# Patient Record
Sex: FEMALE | Race: WHITE | NOT HISPANIC OR LATINO | Employment: OTHER | URBAN - METROPOLITAN AREA
[De-identification: names, ages, dates, MRNs, and addresses within clinical notes are randomized per-mention and may not be internally consistent; named-entity substitution may affect disease eponyms.]

---

## 2017-01-16 ENCOUNTER — ALLSCRIPTS OFFICE VISIT (OUTPATIENT)
Dept: WOUND CARE | Facility: HOSPITAL | Age: 57
End: 2017-01-16
Payer: COMMERCIAL

## 2017-01-16 PROCEDURE — 99212 OFFICE O/P EST SF 10 MIN: CPT | Performed by: SURGERY

## 2018-01-10 NOTE — PROCEDURES
Procedures by Ej Morrison RN at 8/23/2016 11:05 AM      Author:  Ej Morrison RN Service:  Interventional Radiology  Author Type:  Registered Nurse     Filed:  8/23/2016 11:15 AM Date of Service:  8/23/2016 11:05 AM Status:  Signed     :  Ej Morrison RN (Registered Nurse)         Procedure Orders:       1  Insert PICC line [id number] ordered by Ej Morrison RN at 08/23/16 1105                    PICC Line Insertion  Date/Time: 8/23/2016 11:05 AM  Performed by: Ricardo Davies  Authorized by: Renee Grace     Patient location:  Other (comment) (ir)  Consent:     Consent given by:  Patient  Universal protocol:     Procedure explained and questions answered to patient or proxy's satisfaction: yes      Relevant documents present and verified: yes      Site/side marked: yes      Immediately prior to procedure,  a time out was called: yes      Patient identity confirmed:  Verbally with patient and arm band  Pre-procedure details:     Hand hygiene: Hand hygiene performed prior to insertion      Sterile barrier technique: All elements of maximal sterile technique followed      Skin preparation:  2% chlorhexidine    Skin preparation agent: Skin preparation agent completely dried prior to procedure    Indications:     PICC line indications: vascular access    Anesthesia (see MAR for exact dosages): Anesthesia method:  None  Procedure details:     Location:  Brachial    Vessel type: vein      Laterality:  Right    Approach: percutaneous technique used      Catheter size:  5 5 Fr    Ultrasound guidance: yes      Number of attempts:  3    Successful placement: yes      Vessel of catheter tip end:  Upper SVC    Total catheter length (cm):  40    Catheter out on skin (cm):  0    Max flow rate:  999  Post-procedure details:     Post-procedure:  Dressing applied    Assessment:  Blood return through all ports    Patient tolerance of procedure:   Tolerated with difficulty Received for:Provider  EPIC   Aug 23 2016 11:14AM [facility] Standard Time

## 2018-01-13 VITALS
BODY MASS INDEX: 45.99 KG/M2 | HEART RATE: 78 BPM | HEIGHT: 67 IN | DIASTOLIC BLOOD PRESSURE: 70 MMHG | SYSTOLIC BLOOD PRESSURE: 120 MMHG | RESPIRATION RATE: 18 BRPM | TEMPERATURE: 97 F | WEIGHT: 293 LBS

## 2018-01-15 NOTE — MISCELLANEOUS
Message   Recorded as Task   Date: 2016 08:50 AM, Created By: Telma Collier   Task Name: Follow Up   Assigned To: [facility],Team   Regarding Patient: Sukhdev Resendiz, Status: Active   Comment:    Lori Valle - 14 Sep 2016 8:50 AM     TASK CREATED  {patient seen yesterday in [facility] by TB } {per TB}  She spoke to Dr Moose lambert to schedule patient with himself and/or CRNP for wound care/wound vac  Called and spoke to nurse on R2 ([place]) - she states to schedule the appointment and notify them, they'll arrange transport  Called and spoke to Stefanie Arangosandeep at [facility]      patient is scheduled for 2016 - she will contact Vick Knott to notify them of appointment  Insurance referral is required for [employer]     (patient does NOT have designated PCP)  I will arrange for referral     {Patient/GS contact information = R2 [phone], Nataliebell (work cell) [phone] }   Lori Valle - 14 Sep 2016 8:51 AM     TASK EDITED  Dr Moose Gaspar - NPI #[id number], Provider #[id number]  Fax to wound care = [phone]  Lori Valle - 15 Sep 2016 11:04 AM     TASK EDITED  [facility] provider # [phone]  Verified eligibility  They supplied PCP information for [address], [phone]  Called and spoke to Denise at [facility]  She states that patient is not in their system (name,  & SS#) and hasn't yet been seen in their office  Returned call to [employer]  Spoke to [facility]  Unable to issue insurance referral  Kareen Song #[id number], valid 2016 - 2016, 3 visits, CPT #41921  Faxed information to W[facility] Clinic  Indicated on fax that CPT code needed to be supplied to generate authorization  If different code would be billed, they must contact [place] to have CPT code changed  Active Problems    1  Acute venous embolism and thrombosis of deep vessels of proximal lower extremity   (453 41) (I82 4Y9)   2   Delayed surgical wound healing of below-the-knee amputation stump (997 69,998 83)   (T87 89,T81 89XA)   3  Edema (782 3) (R60 9)   4  Hypertension (401 9) (I10)   5  Obesity (278 00) (E66 9)   6  Pulmonary embolism (415 19) (I26 99)    Current Meds   1  Furosemide 20 MG Oral Tablet; Take 1 tablet daily; Therapy: 30QAV3044 to (566 324 313)  Requested for: 12Apr2013; Last   Rx:[id number] Ordered   2  Furosemide 40 MG Oral Tablet; TAKE 1 TABLET EVERY DAY; Therapy: 57ITF2917 to (566 324 313)  Requested for: 12Apr2013; Last   Rx:73Gai4419 Ordered   3  Lisinopril 20 MG Oral Tablet; TAKE 1 TABLET TWICE DAILY; Therapy: 61MTA3360 to (566 324 313)  Requested for: 12Apr2013; Last   Rx:[id number] Ordered   4  Spironolactone 25 MG Oral Tablet; Take 1 tablet by mouth twice daily; Therapy: 26NLR3618 to (566 324 313)  Requested for: 12Apr2013; Last   Rx:[id number] Ordered   5  Warfarin Sodium 1 MG Oral Tablet; TAKE 1 TABLET DAILY; Therapy: 75YSD3352 to (Evaluate:30Ydy6631)  Requested for: 16Apr2013; Last   Rx:[id number] Ordered   6  Warfarin Sodium 10 MG Oral Tablet; as directed; Therapy: 97YGQ5445 to (Last Rx:27Kkm3201)  Requested for: 50YJZ6884 Ordered    Allergies    1   No Known Drug Allergies    Signatures   Electronically signed by : Tyree Engle, ; Sep 15 2016 11:05AM EST                       (Author)

## 2018-01-23 NOTE — PROGRESS NOTES
Assessment    1  Open wound of left lower extremity with complication, subsequent encounter   (V58 89,891 1) (S87 238E)   2  Morbid obesity with BMI of 50 0-59 9, adult (278 01,V85 43) (E66 01,Z68 43)   3  Open wound of left lower extremity, subsequent encounter (V58 89,891 0) (C06 105P)    Wound Care Orders/Instructions    Wound Identification and Instructions   Wound #1: left BKA    Wound Care Instructions  Discussed with Patient/Caregiver  Dressing Type: Alginate  Wash with mild soap and water, normal saline, wound cleanser  As specified, use: may shower; rinse well with clear water prior to getting out of shower  Apply 4% Topical Lidocaine anesthetic solution PRN to wound/ulcer prior to debridement for pain control  Apply specified dressing to wound base/bed  Secondary dressing apply: Gauze, 5 x 9s  Secure with: Tape, hytape and stretch net  Dressing change frequency: Daily  Comments/Other:   Continue to hold NPWT for now  Insert 1/2 inch plain packing in single layer to depth of wound to serve as wick; tape tails  Alginate to wound bed; fill cavity loosely with fluffed 2 x 2s; cover with gauze and ABDs     Wound #2: left medial thigh    Wound Care Instructions  Discussed with Patient/Caregiver  Dressing Type: Alginate  Wash with mild soap and water, normal saline, wound cleanser  As specified, use: may shower; rinse well with clear water prior to getting out of shower  Apply 4% Topical Lidocaine anesthetic solution PRN to wound/ulcer prior to debridement for pain control  Apply specified dressing to wound base/bed  Secondary dressing apply: Meplex border  Dressing change frequency: Daily  Comments/Other:   roll alginate to loosely fill cavity   Wound #3: left lateral thigh   Wound Care Instructions  Discussed with Patient/Caregiver  Dressing Type: Nu-Gauze packing  Wash with mild soap and water, normal saline, wound cleanser   As specified, use: may shower; rinse well with clear water prior to getting out of shower  Apply 4% Topical Lidocaine anesthetic solution PRN to wound/ulcer prior to debridement for pain control  Apply specified dressing to wound base/bed  If using packing in a tunnel, secure tail to skin with tape   Secondary dressing apply: Meplex border  Dressing change frequency: Daily      Wound Goals  Wound Goals:   Healing Goals:   Fair healing potential secondary to moderate comorbid conditions  Wound base will be 25%    Patient will achieve full wound closure and return to full ADLs       Discussion/Summary    Ms Berman Is a 78-year-old female that is here for evaluation of her left lower extremity wounds  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  The left upper thigh wound is improving and I will continue with packing  The left lower medial wound is also improving and I will continue with alginate  The incision line on the stump has significant depth laterally and was extensively debrided  I will pack the deepest portion and cover with alginate  I will likely restart the VAC next week as the surrounding tissues are improving  Chief Complaint  [facility] up visit at [facility] for left BKA and Left medial and lateral thigh wounds  History of Present Illness    Wound Identification HPI   Wound #1: Left BKA    Wound #2: medial left thigh    Wound #3: lateral left thigh          The patient came to Wound Care via wheelchair, remains in wheelchair for visit  The patient is being seen for a follow-up with MD at the [address] [place] [address]  The patient is accompanied by her attendant  The patient's identification was verified  A secondary verification process was completed  Orientation: oriented to person, oriented to place and oriented to time  Blood Glucose:  Not applicable  9/19/16 Patient arrives for initial visit with dressings intact on Left BKA,medial and lateral thigh wounds  Patient had Left BKA by Rubi Olivas with wound dehiscence laterally  Also 2 I and D sites of left medial and lateral thigh  L BKA site with retention sutures and staples intact and large dehisced site with mostly slough  [place] [address] had placed NPWT on site but removed it prior to patient coming  Patient is morbidly obese lives with her daughter and granddaughter,currently in [place] [address] for Rehab,  9/26/16 Pt arrived in wheelchair with dressings intact to L BKA, L medial thigh, and L Lateral thigh  She offers no specific complaints today  History of Falling: Yes = 25   Secondary Diagnosis: Yes = 15   Ambulatory Aid None, Bedrest, Nurse Assist = 0   No = 0   Gait: Impaired = 20 -- Short steps with shuffle, may have difficulty arising from chair, head down, significantly impaired balance, requiring furniture, support person, or walking aid to walk  Mental Status: Oriented to own ability = 0   Total Score: 60     > 45 = High Risk         Dillon Scale:   Sensory Perception: No impairment = 4   Moisture: Very moist = 2   Activity: Chairfast = 2   Mobility: Very limited = 2   Nutrition: Probably inadequate = 2   Friction and Shear: Potential problem = 2   Total Dillon Score: 14       The most recent fall occurred Denies falls since last visit 9/26/16  Number of falls in the last year were 0  Nutrition Assessment Screening: Food intake over the last 3 months due to the loss of appetite, digestive problems, chewing or swallowing difficulties is graded as: 2 = no decrease in food intake   Weight loss during the last 3 months: 3 = no weight loss   Mobility scored as: 0 = bed or chair bound  Psychological Stress and Acute Disease Scored as: 2 = The patient has not experienced psychological stress or acute disease in the last 3 months  Neuropsychological problems scored as: 2 = no psychological problems  Body Mass Index (BMI) scored as: 3 = BMI 23 or greater     Nutritional Assessment Screening Score: 12 - 14 points - Normal nutritional status  Provider Wound Care HPI: Patient is here for follow-up on left lower extremity wounds  She had a significant necrotizing soft tissue infection of her left lower extremity requiring Guillotine amputation followed by closure  She is here for evaluation of her wounds  Pain Assessment   the patient states they have pain  The pain is located in the left BKA when touched  (on a scale of 0 to 10, the patient rates the pain at 8 )   Abuse And Domestic Violence Screen    Yes, the patient is safe at home  The patient states no one is hurting them  Depression And Suicide Screen  No, the patient has not had thoughts of hurting themself  No, the patient has not felt depressed in the past 7 days  Prefered Language is  [place]  Primary Language is  English  Readiness To Learn: Receptive  Barriers To Learning: economic  Preferred Learning: demonstration and written   Education Completed: disease/condition, medications and equipment/supplies   Teaching Method: written and demonstration   Person Taught: patient   Evaluation Of Learning: verbalized/demonstrated understanding      Active Problems     1  Acute venous embolism and thrombosis of deep vessels of proximal lower extremity   (453 41) (I82 4Y9)   2  Delayed surgical wound healing of below-the-knee amputation stump (997 69,998 83)   (T87 89,T81 89XA)   3  Edema (782 3) (R60 9)   4  Hypertension (401 9) (I10)   5  Morbid obesity with BMI of 50 0-59 9, adult (278 01,V85 43) (E66 01,Z68 43)   6  Obesity (278 00) (E66 9)   7  Pulmonary embolism (415 19) (I26 99)    Open wound of right lower extremity with complication, initial encounter (891 1) (S81 801A)       Open wound of right lower extremity, initial encounter (891 0) (C30 660B)          Past Medical History    1  History of Acute deep vein thrombosis of lower limb, unspecified laterality   2  History of essential hypertension (V12 59) (Z86 79)   3   History of obesity (V13 89) (Z86 39)    The active problems and past medical history were reviewed and updated today  Surgical History     1  History of  Section   2  History of Tonsillectomy    The surgical history was reviewed and updated today  History of Amputation Of Leg Below Knee             Family History    The family history was reviewed and updated today  Social History    · Never A Smoker  The social history was reviewed and updated today  Current Meds   1  Furosemide 20 MG Oral Tablet; Take 1 tablet daily; Therapy: 91PKE1210 to (Dustin Bounds)  Requested for: 85Aae4483; Last   Rx:[id number] Ordered   2  Furosemide 40 MG Oral Tablet; TAKE 1 TABLET EVERY DAY; Therapy: 14MRX2185 to (Dustin Bounds)  Requested for: 31Efn8176; Last   Rx:00Axd9968 Ordered   3  Lidocaine HCl - 4 % External Solution; apply to wound/s at [facility] for pain control prior to   debridement; Therapy: (Recorded:84Jro8194) to Recorded   4  Lisinopril 20 MG Oral Tablet; TAKE 1 TABLET TWICE DAILY; Therapy: 80NGM0448 to (Dustin Bounds)  Requested for: 49Oml3569; Last   Rx:93Byr8972 Ordered   5  Warfarin Sodium 1 MG Oral Tablet; TAKE 1 TABLET DAILY; Therapy: 61JYP5903 to (Evaluate:25Etc2805)  Requested for: 64Gyt0221; Last   Rx:02Chr2208 Ordered   6  Warfarin Sodium 10 MG Oral Tablet; as directed; Therapy: 40TCI6116 to (Last Rx:67Gbr6623)  Requested for: 95CBG4459 Ordered    The medication list was reviewed and updated today  Allergies    1  No Known Drug Allergies    Vitals  Vital Signs    Recorded: 01FUR1968 03:30XF   Systolic 946   Diastolic 72   Heart Rate 84   Respiration 18   Temperature 99 4 F, Tympanic   Pain Scale 0   Height 5 ft 7 in   Weight 359 lb    BMI Calculated 56 23   BSA Calculated 2 59     Physical Exam    Wound #1 Assessment left BKA, Care for this wound started on 16  Wound Status: not healed     Length: 6cm x Width: 21 5cm x Depth: 5 5cm   Total: 129sq cm   Wound Volume: 709 5cm3 Tissue type: Subcutaneous, Granulation and Slough   Color of Wound: Yellow - 45%, Black -, Pink - 50% and Grey - 5%   Exudate Amount: Large   Exudate Type: Serosangiunous   Odor: None   Exudate Color: Tan   Wound Edges: Intact   Periwound Skin Condition: Intact, Erythematous, Edema   Comments: 5 areas measured, several loose scabs removed by Dr Colton Batista today  Physician/Provider Wound #1 Exam   I agree with the nursing assessment and documentation  Wound #2 Assessment wound #2 Location:, left medial thigh, Care for this wound started on 9/19/16  Wound Status: not healed  Length: 5cm x Width: 2 5cm x Depth: 1 1cm   Total: 12 5sq cm   Wound Volume: 13 75cm3           Tissue type: Subcutaneous, Granulation and Slough   Color of Wound: Red -, Yellow - 50% and Pink - 50%   Exudate Amount: Moderate   Exudate Type: Serosangiunous and Purulent   Odor: None   Exudate Color: Tan   Wound Edges: Intact and Rolled (epibolized)   Periwound Skin Condition: Intact, indurated            Physician/Provider Wound #2 Exam   I agree with the nursing assessment and documentation  Wound #3 Assessment wound #3 Location:, left lateral thigh  Care for this wound started on 9/19/16   Wound Status: not healed  Length: 1cm x Width: 0 3cm x Depth: 1 8cm   Total: 0 3sq cm   Wound Volume: 0 54cm3           Tissue type: unable to visualize base unable to visualize base   Exudate Amount: Scant   Exudate Type: Serosangiunous and Purulent   Odor: None   Exudate Color: Tan   Wound Edges: Intact   Periwound Skin Condition: Intact, Erythematous            Physician/Provider Wound #3 Exam   I agree with the nursing assessment and documentation  Jose Miguel Maurice 1:    Wound Nursing Care Plan   Impaired Tissue Integrity related to:   Knowledge Deficit Related To:   Risk for Infection related to open wound:   Pain related to disease process and/or wound treatment:   Imbalanced Nutrition, less than body requirements related to inadequate intake and/or disease process:   Impaired Mobility related to: L JOHNIEA   Self Care Deficit related to wound dressing changes:   Goals   Patient will achieve 100% epithelialization:  Patient will maintain skin integrity:  Patient will demonstrate and verbalize knowledge of their disease process and management:  Patient will verbalize signs and symptoms of infection and when to notify physician or [facility]:    Patient will demonstrate and verbalize infection control and preventative measures:  Patient will verbalize knowledge of and demonstrate adherence to interventions to achieve optimal nutrition required for wound healing:  Patient will be able to maintain or increase current ability to perform ADLs: Marleny Childs Patient will demonstrate offloading and body positioning to effectively decrease edema:  Wound Nursing Care Interventions:   Provide moist wound healing:  Evaluate current medication regime for medications which may slow/impede wound healing: coumadin  Implement offloading measures (turn & reposition schedule/method, ortho shoes/boots, floating heels, crutches, specialty surfaces:  Teach and evaluate effectiveness of offloading measures:  Teach patient and/or family about disease process and management methods:  Teach patient to take pain medication 30 minutes prior to wound care visit:  Assess patient's nutrition on each wound care visit (including protein and fluid intake): Marleny Childs Educate on diet and hydration required to enhance wound healing:  Assess mobility and how it may affect wound healing:  Teach patient on edema reducing measures:  Assess patient's coping skills (is the wound interrupting their lifestyle, social interactions, etc ):    Assess for patient compliance to established plan of care: Marleny Childs    Other:  Care plan initiated 9/19/16, reviewed and updated 9/26/16      Procedure      Wound #1: L BKA     Nurse Dressing Change: Wound ##1 L BKA The wound located on the L BKA  Applied 4% topical Lidocaine solution to wound/ulcer prior to debridement for pain control  Wound care rendered as per Physician/Advanced Practitioner order/plan  Order sent with patient to facility  Return to [facility] 1 week Dr Joanne Patel  Comments:    Excisional Debridement Subcutaneous Tissue:   Wound was excisionally debrided to subcutaneous tissue as follows  Prior to the procedure, the patient was identified using two identifiers, the general consent was signed, the proper site of procedure was identified, and a time out was taken  Anesthesia: Local anesthesia with 4% topical lidocaine was utilized prior to the procedure for pain control  #15 surgical blade and a curette was utilized to surgically excise devitalized tissue and/or slough through epidermis, dermis and into the subcutaneous tissue  The total sq cm excised was 105sq cm  There was minimal bleeding controlled with gentle pressure  the patient tolerated the procedure well without complication  CPT Code(s)   [id number] - Excisional DebridementTo Subcutaneous Tissue; first 20 sq cm   19566 - Each Additional 20 sq cm - Excisional DebridementTo Subcutaneous Tissue  Wound #2: L medial thigh     Nurse Dressing Change:   Wound #2 The wound located on the L med thigh  Applied 4% topical Lidocaine solution to wound/ulcer prior to debridement for pain control  Wound care rendered as per Physician/Advanced Practitioner order/plan  Order sent with patient to facility  Return to [facility] 1 week Dr Joanne Patel  Comments:   Wound #3: L lateral thigh     Nurse Dressing Change:   Wound #3 The wound located on the lateral thigh  Applied 4% topical Lidocaine solution to wound/ulcer prior to debridement for pain control  Wound care rendered as per Physician/Advanced Practitioner order/plan  Order sent with patient to facility      Return to Wound Management [place] 1 week Dr Jessica Petit    Comments:      Future Appointments    Date/Time Provider Specialty Site   10/03/2016 01:45 PM Justine Sherman MD General Surgery [facility]     Signatures   Electronically signed by : My Zaragoza MD; Sep 27 2016  9:12AM EST                       (Author)

## 2018-01-24 NOTE — PROGRESS NOTES
Assessment    1  Open wound of left lower extremity with complication, subsequent encounter   (V58 89,891 1) (B04 395T)   2  Morbid obesity with BMI of 50 0-59 9, adult (278 01,V85 43) (D85 53,I58 26)    Wound Care Orders/Instructions    Wound Identification and Instructions   Wound #1: left BKA    Wound Care Instructions  Discussed with Patient/Caregiver  Dressing Type: Alginate  Wash with mild soap and water, normal saline, wound cleanser  As specified, use: may shower; rinse well with clear water prior to getting out of shower  Apply 4% Topical Lidocaine anesthetic solution PRN to wound/ulcer prior to debridement for pain control  Apply specified dressing to wound base/bed  Secondary dressing apply: Gauze, 5 x 9s  Secure with: Tape, hytape and stretch net  Dressing change frequency: Three times per week  Comments/Other:   restart NPWT black sponge tracked to dorsal leg and in 2 sites drain site and lateral BKA incision  AMD PACKING IN WOUNDS CENTRALLY AND MEDIALLY  Wound #2: left medial thigh    Wound Care Instructions  Discussed with Patient/Caregiver  Dressing Type: Alginate  Wash with mild soap and water, normal saline, wound cleanser  As specified, use: may shower; rinse well with clear water prior to getting out of shower  Apply 4% Topical Lidocaine anesthetic solution PRN to wound/ulcer prior to debridement for pain control  Apply specified dressing to wound base/bed  Secondary dressing apply: Meplex border  Dressing change frequency: Daily  Comments/Other:   roll alginate to loosely fill cavity   Wound #3: left lateral thigh/Healed 10/24/16   Wound Care Instructions  Discussed with Patient/Caregiver  Wash with mild soap and water, normal saline, wound cleanser  As specified, use: may shower; rinse well with clear water prior to getting out of shower    Comments/Other:   Wound healed      Wound Goals  Wound Goals:   Healing Goals:   Fair healing potential secondary to moderate comorbid conditions  Wound base will be 25%    Patient will achieve full wound closure and return to full ADLs       Discussion/Summary    Ms Berman Is a 43-year-old female that is here for multiple wounds of her left lower extremity  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 for left BKA and Left medial and lateral thigh wounds  History of Present Illness    Wound Identification HPI   Wound #1: Left BKA    Wound #2: medial left thigh    Wound #3: lateral left thigh healed 10/24/16          The patient came to Wound Care via wheelchair, remains in wheelchair for visit  The patient is being seen for a follow-up with MD at the [address] [place] [address]  The patient is accompanied by her attendant  The patient's identification was verified  A secondary verification process was completed  Orientation: oriented to person, oriented to place and oriented to time  Blood Glucose:  Not applicable  9/19/16 Patient arrives for initial visit with dressings intact on Left BKA,medial and lateral thigh wounds  Patient had Left BKA by Apolinar Durant with wound dehiscence laterally  Also 2 I and D sites of left medial and lateral thigh  L BKA site with retention sutures and staples intact and large dehisced site with mostly slough  [place] [address] had placed NPWT on site but removed it prior to patient coming  Patient is morbidly obese lives with her daughter and granddaughter,currently in [place] [address] for Rehab,  9/26/16 Pt arrived in wheelchair with dressings intact to L BKA, L medial thigh, and L Lateral thigh   She offers no specific complaints today 10/3/16 Patient arrives with dressings intact on LBKA ,medial and lateral thigh  Increased pink tissue in wound base  10/24/16 Pt arrived in wheelchair with wound vac running 125 mmHg continuous  Dressing intact  Pt states wound burns sometimes depending what she is doing and it's intermittent  Pt reports she was recently started on Iron, but does not know the mg  lateral thigh wound healed  History of Falling: Yes = 25   Secondary Diagnosis: Yes = 15   Ambulatory Aid None, Bedrest, Nurse Assist = 0   No = 0   Gait: Impaired = 20 -- Short steps with shuffle, may have difficulty arising from chair, head down, significantly impaired balance, requiring furniture, support person, or walking aid to walk  Mental Status: Oriented to own ability = 0   Total Score: 60     > 45 = High Risk         Dillon Scale:   Sensory Perception: No impairment = 4   Moisture: Very moist = 2   Activity: Chairfast = 2   Mobility: Very limited = 2   Nutrition: Probably inadequate = 2   Friction and Shear: Potential problem = 2   Total Dillon Score: 14       The most recent fall occurred Denies falls since last visit 10/24/16  Number of falls in the last year were 0  Nutrition Assessment Screening: Food intake over the last 3 months due to the loss of appetite, digestive problems, chewing or swallowing difficulties is graded as: 2 = no decrease in food intake   Weight loss during the last 3 months: 3 = no weight loss   Mobility scored as: 0 = bed or chair bound  Psychological Stress and Acute Disease Scored as: 2 = The patient has not experienced psychological stress or acute disease in the last 3 months  Neuropsychological problems scored as: 2 = no psychological problems  Body Mass Index (BMI) scored as: 3 = BMI 23 or greater  Nutritional Assessment Screening Score: 12 - 14 points - Normal nutritional status  Provider Wound Care HPI: Patient is here for follow-up for left extremity wounds   She status post guillotine amputation followed by revision for gas gangrene  She was recently discharged from rehabilitation  Overall she's doing well  She has been tolerating the VAC dressing withoutDifficulty         Pain Assessment   the patient states they have pain  The pain is located in the left BKA when touched  The pain radiates to the Burning comes depending what she is doing and it is intermittent  The patient describes the pain as burning  (on a scale of 0 to 10, the patient rates the pain at 7 )   Abuse And Domestic Violence Screen    Yes, the patient is safe at home  The patient states no one is hurting them  Depression And Suicide Screen  No, the patient has not had thoughts of hurting themself  No, the patient has not felt depressed in the past 7 days  Prefered Language is  [place]  Primary Language is  English  Readiness To Learn: Receptive  Barriers To Learning: economic  Preferred Learning: demonstration and written   Education Completed: disease/condition, medications and equipment/supplies   Teaching Method: written and demonstration   Person Taught: patient   Evaluation Of Learning: verbalized/demonstrated understanding      Active Problems    1  Acute venous embolism and thrombosis of deep vessels of proximal lower extremity   (453 41) (I82 4Y9)   2  Delayed surgical wound healing of below-the-knee amputation stump (997 69,998 83)   (T87 89,T81 89XA)   3  Edema (782 3) (R60 9)   4  Hypertension (401 9) (I10)   5  Morbid obesity with BMI of 50 0-59 9, adult (278 01,V85 43) (E66 01,Z68 43)   6  Obesity (278 00) (E66 9)   7  Open wound of left lower extremity with complication, initial encounter (891 1) (S81 802A)   8  Open wound of left lower extremity with complication, subsequent encounter   (V58 89,891 1) (S81 802D)   9  Open wound of left lower extremity, initial encounter (891 0) (S81 802A)   10  Open wound of left lower extremity, subsequent encounter (V58 89,891 0) (S81 802D)   11   Open wound of right lower extremity with complication, initial encounter (891 1)    (S81 801A)   12  Open wound of right lower extremity with complication, subsequent encounter    (V58 89,891 1) (S81 801D)   13  Open wound of right lower extremity, initial encounter (891 0) (S81 801A)   14  Open wound of right lower extremity, subsequent encounter (V58 89,891 0) (S81 801D)   15  Pulmonary embolism (415 19) (I26 99)    Past Medical History    1  History of Acute deep vein thrombosis of lower limb, unspecified laterality   2  History of essential hypertension (V12 59) (Z86 79)   3  History of obesity (V13 89) (Z86 39)    The active problems and past medical history were reviewed and updated today  Surgical History    1  History of Amputation Of Leg Below Knee   2  History of  Section   3  History of Tonsillectomy    The surgical history was reviewed and updated today  Family History    The family history was reviewed and updated today  Social History    · Never A Smoker  The social history was reviewed and is unchanged  Current Meds   1  Furosemide 20 MG Oral Tablet; Take 1 tablet daily; Therapy: 73JEX3577 to (Filiberto Mad)  Requested for: 2013; Last   Rx:2013 Ordered   2  Furosemide 40 MG Oral Tablet; TAKE 1 TABLET EVERY DAY; Therapy: 53VZI2680 to (Filiberto Mad)  Requested for: 2013; Last   Rx:2013 Ordered   3  Iron Supplement 325 (65 Fe) MG TABS; Therapy: (Recorded:41Fjc9090) to Recorded   4  Lidocaine HCl - 4 % External Solution; apply to wound/s at [facility] for pain control prior to   debridement; Therapy: (Recorded:58Yba4598) to Recorded   5  Lisinopril 20 MG Oral Tablet; TAKE 1 TABLET TWICE DAILY; Therapy: 64MPA0150 to (Filiberto Mad)  Requested for: 2013; Last   Rx:2013 Ordered   6  Warfarin Sodium 1 MG Oral Tablet; TAKE 1 TABLET DAILY; Therapy: 67IKN5643 to (Evaluate:42Pli5457)  Requested for: 2013; Last   Rx:15Ury3283 Ordered   7   Warfarin Sodium 10 MG Oral Tablet; as directed; Therapy: 69YAQ1369 to (Last Rx:52Xso5987)  Requested for: 94BBI5503 Ordered    The medication list was reviewed and updated today  Allergies    1  No Known Drug Allergies    Vitals  Vital Signs    Recorded: 52OYL0790 25:55DB   Systolic 880   Diastolic 74   Heart Rate 88   Respiration 18   Temperature 97 5 F   Pain Scale 7   Height 5 ft 7 in   Weight 359 lb    BMI Calculated 56 23   BSA Calculated 2 59     Physical Exam    Wound #1 Assessment wound #1 Location:, left BKA, Care for this wound started on 9/19/16  Wound Status: not healed  Length: 2cm x Width: 5cm x Depth: 0 7cm   Total: 10sq cm   Wound Volume: 7cm3           Tissue type: Subcutaneous, Granulation and Slough   Color of Wound: Red - 90%, Yellow - 10%, Black - ~U%, Pink - and Grey -   Exudate Amount: Moderate   Exudate Type: Serosangiunous   Odor: None   Exudate Color: Tan   Wound Edges: Intact   Periwound Skin Condition: Intact, Erythematous, Edema   Comments: 5 areas measured, No eschar  Physician/Provider Wound #1 Exam   I agree with the nursing assessment and documentation  Wound #2 Assessment wound #2 Location:, left medial thigh, Care for this wound started on 9/19/16  Wound Status: not healed  Length: 0 2cm x Width: 1cm x Depth: 0 7cm   Total: 0 2sq cm   Wound Volume: 1 4cm3           Tissue type: Subcutaneous, Granulation and Slough   Color of Wound: Red - 100%, Yellow - and Pink -   Exudate Amount: Minimal   Exudate Type: Serosangiunous   Odor: None   Exudate Color: Tan   Wound Edges: Intact   Periwound Skin Condition: Intact, indurated            Physician/Provider Wound #2 Exam   I agree with the nursing assessment and documentation  Wound #3 Assessment wound #3 Location:, left lateral thigh, healed on 10/24/16  Care for this wound started on 9/19/16   Wound Status: healed     Length: 0 cm x Width: 0 cm x Depth: 0 cm   Total: 0 sq cm   Wound Volume: 0 cm3           Tissue type: Epidermis and unable to visualize base unable to visualize base   Exudate Amount: None   Odor: None   Periwound Skin Condition: Erythematous   Comments: Wound healed  Physician/Provider Wound #3 Exam   I agree with the nursing assessment and documentation  Jose Miguel Maurice 1: Wound Nursing Care Plan   Impaired Tissue Integrity related to:   Knowledge Deficit Related To:   Risk for Infection related to open wound:   Pain related to disease process and/or wound treatment:   Imbalanced Nutrition, less than body requirements related to inadequate intake and/or disease process:   Impaired Mobility related to: L BKA   Self Care Deficit related to wound dressing changes:   Goals   Patient will achieve 100% epithelialization:  Patient will maintain skin integrity:  Patient will demonstrate and verbalize knowledge of their disease process and management:  Patient will verbalize signs and symptoms of infection and when to notify physician or [facility]:    Patient will demonstrate and verbalize infection control and preventative measures:  Patient will verbalize knowledge of and demonstrate adherence to interventions to achieve optimal nutrition required for wound healing:  Patient will be able to maintain or increase current ability to perform ADLs: Stephanie Vargas Patient will demonstrate offloading and body positioning to effectively decrease edema:  Wound Nursing Care Interventions:   Provide moist wound healing:  Evaluate current medication regime for medications which may slow/impede wound healing: coumadin  Implement offloading measures (turn & reposition schedule/method, ortho shoes/boots, floating heels, crutches, specialty surfaces:  Teach and evaluate effectiveness of offloading measures:  Teach patient and/or family about disease process and management methods:  Teach patient to take pain medication 30 minutes prior to wound care visit:     Assess patient's nutrition on each wound care visit (including protein and fluid intake): Hair Van Educate on diet and hydration required to enhance wound healing:  Assess mobility and how it may affect wound healing:  Teach patient on edema reducing measures:  Assess patient's coping skills (is the wound interrupting their lifestyle, social interactions, etc ):    Assess for patient compliance to established plan of care: Hair Van Other:  Care plan initiated 9/19/16, reviewed and updated 9/26/16,10/24/16      Procedure      Wound #1: BKA L     Nurse Dressing Change:   Wound #1 The wound located on the L BKA  Applied 4% topical Lidocaine solution to wound/ulcer prior to debridement for pain control  Wound care rendered as per Physician/Advanced Practitioner order/plan  Order sent with patient to facility  Return to [facility]  Comments:,   It was noted that and 3 pieces of black sponge were removed from the wound bed  The canister contained 50cc, of Tan drainage  Negative Pressure Wound Therapy Dressing Removal:  Prior to the procedure, the patient was identified using two identifiers, the general consent was signed, the proper site of procedure was identified, and a time out was taken  Patient arrived with negative pressure wound therapy dressing sealed and intact with the pressure set at 125mmHg continuous  Comments: No Communication label, one black sponge bridge removed and two black sponge wound base  NWPT REMOVED AND NOT REAPPLIED  Excisional Debridement Subcutaneous Tissue:   Wound was excisionally debrided to subcutaneous tissue as follows  Prior to the procedure, the patient was identified using two identifiers, the general consent was signed, the proper site of procedure was identified, and a time out was taken  Anesthesia: Local anesthesia with 4% topical lidocaine was utilized prior to the procedure for pain control     A curette was utilized to surgically excise devitalized tissue and/or slough through epidermis, dermis and into the subcutaneous tissue  The total sq cm excised was 10sq cm  There was minimal bleeding controlled with gentle pressure  the patient tolerated the procedure well without complication  CPT Code(s)   [id number] - Excisional DebridementTo Subcutaneous Tissue; first 20 sq cm  Wound #2: L Med thigh     Nurse Dressing Change:   Wound #2 The wound located on the L medial thigh  Applied 4% topical Lidocaine solution to wound/ulcer prior to debridement for pain control  Wound care rendered as per Physician/Advanced Practitioner order/plan  Order sent with patient to facility  Return to [place] [address]  Comments:   Wound #3: Lat L thigh/healed 10/24/16     Nurse Dressing Change:   Wound #3 The wound located on the Lat L thigh  Applied 4% topical Lidocaine solution to wound/ulcer prior to debridement for pain control  Wound care rendered as per Physician/Advanced Practitioner order/plan  Order sent with patient to facility  Return to [place] [address]    Comments:, Wound healed      Future Appointments    Date/Time Provider Specialty Site   11/07/2016 01:45 PM Abi Ludwig MD General Surgery [facility]     Signatures   Electronically signed by : Jonathan Brown MD; Oct 25 2016 10:22AM EST                       (Author)

## 2018-10-12 ENCOUNTER — APPOINTMENT (OUTPATIENT)
Dept: LAB | Facility: HOSPITAL | Age: 58
End: 2018-10-12
Payer: COMMERCIAL

## 2018-10-12 ENCOUNTER — TRANSCRIBE ORDERS (OUTPATIENT)
Dept: ADMINISTRATIVE | Facility: HOSPITAL | Age: 58
End: 2018-10-12

## 2018-10-12 DIAGNOSIS — I10 ESSENTIAL HYPERTENSION, MALIGNANT: ICD-10-CM

## 2018-10-12 DIAGNOSIS — I48.20 CHRONIC ATRIAL FIBRILLATION (HCC): ICD-10-CM

## 2018-10-12 DIAGNOSIS — I10 ESSENTIAL HYPERTENSION, MALIGNANT: Primary | ICD-10-CM

## 2018-10-12 LAB
ALBUMIN SERPL BCP-MCNC: 4 G/DL (ref 3–5.2)
ALP SERPL-CCNC: 63 U/L (ref 43–122)
ALT SERPL W P-5'-P-CCNC: 20 U/L (ref 9–52)
ANION GAP SERPL CALCULATED.3IONS-SCNC: 8 MMOL/L (ref 5–14)
AST SERPL W P-5'-P-CCNC: 23 U/L (ref 14–36)
BASOPHILS # BLD AUTO: 0 THOUSANDS/ΜL (ref 0–0.1)
BASOPHILS NFR BLD AUTO: 1 % (ref 0–1)
BILIRUB SERPL-MCNC: 0.5 MG/DL
BUN SERPL-MCNC: 14 MG/DL (ref 5–25)
CALCIUM SERPL-MCNC: 9 MG/DL (ref 8.4–10.2)
CHLORIDE SERPL-SCNC: 102 MMOL/L (ref 97–108)
CHOLEST SERPL-MCNC: 222 MG/DL
CO2 SERPL-SCNC: 29 MMOL/L (ref 22–30)
CREAT SERPL-MCNC: 0.75 MG/DL (ref 0.6–1.2)
EOSINOPHIL # BLD AUTO: 0.2 THOUSAND/ΜL (ref 0–0.4)
EOSINOPHIL NFR BLD AUTO: 3 % (ref 0–6)
ERYTHROCYTE [DISTWIDTH] IN BLOOD BY AUTOMATED COUNT: 14.4 %
GFR SERPL CREATININE-BSD FRML MDRD: 89 ML/MIN/1.73SQ M
GLUCOSE SERPL-MCNC: 105 MG/DL (ref 70–99)
HCT VFR BLD AUTO: 38.7 % (ref 36–46)
HDLC SERPL-MCNC: 34 MG/DL (ref 40–59)
HGB BLD-MCNC: 13 G/DL (ref 12–16)
INR PPP: 2.17 (ref 0.89–1.1)
LDLC SERPL CALC-MCNC: 145 MG/DL
LYMPHOCYTES # BLD AUTO: 1.4 THOUSANDS/ΜL (ref 0.5–4)
LYMPHOCYTES NFR BLD AUTO: 24 % (ref 20–50)
MCH RBC QN AUTO: 31.2 PG (ref 26–34)
MCHC RBC AUTO-ENTMCNC: 33.5 G/DL (ref 31–36)
MCV RBC AUTO: 93 FL (ref 80–100)
MONOCYTES # BLD AUTO: 0.4 THOUSAND/ΜL (ref 0.2–0.9)
MONOCYTES NFR BLD AUTO: 6 % (ref 1–10)
NEUTROPHILS # BLD AUTO: 3.9 THOUSANDS/ΜL (ref 1.8–7.8)
NEUTS SEG NFR BLD AUTO: 66 % (ref 45–65)
NONHDLC SERPL-MCNC: 188 MG/DL
PLATELET # BLD AUTO: 208 THOUSANDS/UL (ref 150–450)
PMV BLD AUTO: 8.4 FL (ref 8.9–12.7)
POTASSIUM SERPL-SCNC: 3.8 MMOL/L (ref 3.6–5)
PROT SERPL-MCNC: 7.3 G/DL (ref 5.9–8.4)
PROTHROMBIN TIME: 22 SECONDS (ref 9.5–11.6)
RBC # BLD AUTO: 4.15 MILLION/UL (ref 4–5.2)
SODIUM SERPL-SCNC: 139 MMOL/L (ref 137–147)
TRIGL SERPL-MCNC: 215 MG/DL
TSH SERPL DL<=0.05 MIU/L-ACNC: 3.05 UIU/ML (ref 0.47–4.68)
WBC # BLD AUTO: 5.9 THOUSAND/UL (ref 4.5–11)

## 2018-10-12 PROCEDURE — 80061 LIPID PANEL: CPT

## 2018-10-12 PROCEDURE — 85610 PROTHROMBIN TIME: CPT

## 2018-10-12 PROCEDURE — 85025 COMPLETE CBC W/AUTO DIFF WBC: CPT

## 2018-10-12 PROCEDURE — 36415 COLL VENOUS BLD VENIPUNCTURE: CPT

## 2018-10-12 PROCEDURE — 84443 ASSAY THYROID STIM HORMONE: CPT

## 2018-10-12 PROCEDURE — 80053 COMPREHEN METABOLIC PANEL: CPT

## 2019-08-20 ENCOUNTER — TRANSCRIBE ORDERS (OUTPATIENT)
Dept: ADMINISTRATIVE | Facility: HOSPITAL | Age: 59
End: 2019-08-20

## 2019-08-20 ENCOUNTER — APPOINTMENT (OUTPATIENT)
Dept: LAB | Facility: HOSPITAL | Age: 59
End: 2019-08-20
Payer: COMMERCIAL

## 2019-08-20 DIAGNOSIS — E78.2 MIXED HYPERLIPIDEMIA: ICD-10-CM

## 2019-08-20 DIAGNOSIS — Z12.31 VISIT FOR SCREENING MAMMOGRAM: ICD-10-CM

## 2019-08-20 DIAGNOSIS — I10 ESSENTIAL HYPERTENSION: ICD-10-CM

## 2019-08-20 DIAGNOSIS — I48.20 CHRONIC ATRIAL FIBRILLATION (HCC): ICD-10-CM

## 2019-08-20 DIAGNOSIS — I10 ESSENTIAL HYPERTENSION: Primary | ICD-10-CM

## 2019-08-20 LAB
ALBUMIN SERPL BCP-MCNC: 3.7 G/DL (ref 3.5–5)
ALP SERPL-CCNC: 63 U/L (ref 46–116)
ALT SERPL W P-5'-P-CCNC: 18 U/L (ref 12–78)
ANION GAP SERPL CALCULATED.3IONS-SCNC: 9 MMOL/L (ref 4–13)
AST SERPL W P-5'-P-CCNC: 15 U/L (ref 5–45)
BASOPHILS # BLD AUTO: 0.04 THOUSANDS/ΜL (ref 0–0.1)
BASOPHILS NFR BLD AUTO: 1 % (ref 0–1)
BILIRUB SERPL-MCNC: 0.48 MG/DL (ref 0.2–1)
BUN SERPL-MCNC: 17 MG/DL (ref 5–25)
CALCIUM SERPL-MCNC: 8.8 MG/DL (ref 8.3–10.1)
CHLORIDE SERPL-SCNC: 103 MMOL/L (ref 100–108)
CHOLEST SERPL-MCNC: 238 MG/DL (ref 50–200)
CK SERPL-CCNC: 48 U/L (ref 26–192)
CO2 SERPL-SCNC: 28 MMOL/L (ref 21–32)
CREAT SERPL-MCNC: 0.94 MG/DL (ref 0.6–1.3)
EOSINOPHIL # BLD AUTO: 0.15 THOUSAND/ΜL (ref 0–0.61)
EOSINOPHIL NFR BLD AUTO: 2 % (ref 0–6)
ERYTHROCYTE [DISTWIDTH] IN BLOOD BY AUTOMATED COUNT: 13.5 % (ref 11.6–15.1)
GFR SERPL CREATININE-BSD FRML MDRD: 67 ML/MIN/1.73SQ M
GLUCOSE P FAST SERPL-MCNC: 119 MG/DL (ref 65–99)
HCT VFR BLD AUTO: 40.7 % (ref 34.8–46.1)
HDLC SERPL-MCNC: 41 MG/DL (ref 40–60)
HGB BLD-MCNC: 12.9 G/DL (ref 11.5–15.4)
IMM GRANULOCYTES # BLD AUTO: 0.03 THOUSAND/UL (ref 0–0.2)
IMM GRANULOCYTES NFR BLD AUTO: 1 % (ref 0–2)
INR PPP: 3.21 (ref 0.84–1.19)
LDLC SERPL CALC-MCNC: 164 MG/DL (ref 0–100)
LYMPHOCYTES # BLD AUTO: 1.05 THOUSANDS/ΜL (ref 0.6–4.47)
LYMPHOCYTES NFR BLD AUTO: 16 % (ref 14–44)
MCH RBC QN AUTO: 30.6 PG (ref 26.8–34.3)
MCHC RBC AUTO-ENTMCNC: 31.7 G/DL (ref 31.4–37.4)
MCV RBC AUTO: 97 FL (ref 82–98)
MONOCYTES # BLD AUTO: 0.41 THOUSAND/ΜL (ref 0.17–1.22)
MONOCYTES NFR BLD AUTO: 6 % (ref 4–12)
NEUTROPHILS # BLD AUTO: 4.96 THOUSANDS/ΜL (ref 1.85–7.62)
NEUTS SEG NFR BLD AUTO: 74 % (ref 43–75)
NONHDLC SERPL-MCNC: 197 MG/DL
NRBC BLD AUTO-RTO: 0 /100 WBCS
PLATELET # BLD AUTO: 210 THOUSANDS/UL (ref 149–390)
PMV BLD AUTO: 10.4 FL (ref 8.9–12.7)
POTASSIUM SERPL-SCNC: 3.4 MMOL/L (ref 3.5–5.3)
PROT SERPL-MCNC: 7.7 G/DL (ref 6.4–8.2)
PROTHROMBIN TIME: 33.6 SECONDS (ref 11.6–14.5)
RBC # BLD AUTO: 4.21 MILLION/UL (ref 3.81–5.12)
SODIUM SERPL-SCNC: 140 MMOL/L (ref 136–145)
TRIGL SERPL-MCNC: 166 MG/DL
TSH SERPL DL<=0.05 MIU/L-ACNC: 2.23 UIU/ML (ref 0.36–3.74)
WBC # BLD AUTO: 6.64 THOUSAND/UL (ref 4.31–10.16)

## 2019-08-20 PROCEDURE — 85610 PROTHROMBIN TIME: CPT

## 2019-08-20 PROCEDURE — 82550 ASSAY OF CK (CPK): CPT

## 2019-08-20 PROCEDURE — 84443 ASSAY THYROID STIM HORMONE: CPT

## 2019-08-20 PROCEDURE — 80061 LIPID PANEL: CPT

## 2019-08-20 PROCEDURE — 80053 COMPREHEN METABOLIC PANEL: CPT

## 2019-08-20 PROCEDURE — 85025 COMPLETE CBC W/AUTO DIFF WBC: CPT

## 2019-08-20 PROCEDURE — 36415 COLL VENOUS BLD VENIPUNCTURE: CPT

## 2019-09-26 ENCOUNTER — HOSPITAL ENCOUNTER (OUTPATIENT)
Dept: MAMMOGRAPHY | Facility: HOSPITAL | Age: 59
Discharge: HOME | End: 2019-09-26
Payer: COMMERCIAL

## 2019-09-26 ENCOUNTER — TRANSCRIBE ORDERS (OUTPATIENT)
Dept: ADMINISTRATIVE | Facility: HOSPITAL | Age: 59
End: 2019-09-26

## 2019-09-26 ENCOUNTER — APPOINTMENT (OUTPATIENT)
Dept: LAB | Facility: HOSPITAL | Age: 59
End: 2019-09-26
Payer: COMMERCIAL

## 2019-09-26 VITALS — HEIGHT: 69 IN | WEIGHT: 293 LBS | BODY MASS INDEX: 43.4 KG/M2

## 2019-09-26 DIAGNOSIS — I48.20 CHRONIC ATRIAL FIBRILLATION (HCC): Primary | ICD-10-CM

## 2019-09-26 DIAGNOSIS — I48.20 CHRONIC ATRIAL FIBRILLATION (HCC): ICD-10-CM

## 2019-09-26 DIAGNOSIS — Z12.31 VISIT FOR SCREENING MAMMOGRAM: ICD-10-CM

## 2019-09-26 LAB
INR PPP: 3.93 (ref 0.84–1.19)
PROTHROMBIN TIME: 39.4 SECONDS (ref 11.6–14.5)

## 2019-09-26 PROCEDURE — 85610 PROTHROMBIN TIME: CPT

## 2019-09-26 PROCEDURE — 77067 SCR MAMMO BI INCL CAD: CPT

## 2019-09-26 PROCEDURE — 36415 COLL VENOUS BLD VENIPUNCTURE: CPT

## 2019-10-15 ENCOUNTER — APPOINTMENT (OUTPATIENT)
Dept: LAB | Facility: HOSPITAL | Age: 59
End: 2019-10-15
Payer: COMMERCIAL

## 2019-10-15 ENCOUNTER — HOSPITAL ENCOUNTER (OUTPATIENT)
Dept: MAMMOGRAPHY | Facility: CLINIC | Age: 59
Discharge: HOME | End: 2019-10-15
Payer: COMMERCIAL

## 2019-10-15 VITALS — BODY MASS INDEX: 43.4 KG/M2 | HEIGHT: 69 IN | WEIGHT: 293 LBS

## 2019-10-15 DIAGNOSIS — I48.20 CHRONIC ATRIAL FIBRILLATION (HCC): ICD-10-CM

## 2019-10-15 DIAGNOSIS — R92.1 BREAST CALCIFICATION, LEFT: ICD-10-CM

## 2019-10-15 DIAGNOSIS — R92.8 ABNORMAL MAMMOGRAM: ICD-10-CM

## 2019-10-15 LAB
INR PPP: 3.48 (ref 0.91–1.09)
PROTHROMBIN TIME: 37.7 SECONDS (ref 9.8–12)

## 2019-10-15 PROCEDURE — 85610 PROTHROMBIN TIME: CPT

## 2019-10-15 PROCEDURE — 36415 COLL VENOUS BLD VENIPUNCTURE: CPT

## 2019-10-15 PROCEDURE — 76642 ULTRASOUND BREAST LIMITED: CPT

## 2019-10-15 PROCEDURE — 77065 DX MAMMO INCL CAD UNI: CPT

## 2019-10-17 ENCOUNTER — TELEPHONE (OUTPATIENT)
Dept: MAMMOGRAPHY | Facility: CLINIC | Age: 59
End: 2019-10-17

## 2019-10-17 ENCOUNTER — TELEPHONE (OUTPATIENT)
Dept: SURGICAL ONCOLOGY | Facility: CLINIC | Age: 59
End: 2019-10-17

## 2019-10-17 ENCOUNTER — TELEPHONE (OUTPATIENT)
Dept: SURGICAL ONCOLOGY | Facility: HOSPITAL | Age: 59
End: 2019-10-17

## 2019-10-17 NOTE — TELEPHONE ENCOUNTER
New Patient Encounter    New Patient Intake Form   Patient Details:  Mariah Barrios  1960  [id number]    Background Information:  [address] starts by opening a telephone encounter and gathering the following information   Who is calling to schedule? If not self, relationship to patient? rbc   Referring Provider rbc   What is the diagnosis? Breast calc's   When was the diagnosis? 10/2019   Is patient aware of diagnosis? Yes   Reason for visit? NP DX   Have you had any testing done? If so: when, where? Yes   Are records in [employer]? yes   Was the patient told to bring a disk? no   Scheduling Information:   Preferred [place]:  [place]     Requesting Specific Provider? [facility]     Are there any dates/time the patient cannot be seen? no   Counseling Pre-Screen:  If the patient answers YES to any of the below questions, please route to the appropriate location specific counselor    Have you felt anxious or worried about cancer and the treatment you are receiving? Did Not Speak to Patient   Has your diagnosis caused physical, emotional, or financial hardship for you? Did Not Speak to Patient   Note: Do not ask the patient about transportation issues/needs  Please notate if the patient brings it up and the counselor will schedule accordingly  Miscellaneous: n/a   After completing the above information, please route to Financial Counselor and the appropriate Nurse Navigator for review

## 2019-10-21 PROBLEM — G54.7 PHANTOM LIMB SYNDROME WITHOUT PAIN (HCC): Status: ACTIVE | Noted: 2017-08-27

## 2019-10-21 PROBLEM — S78.119A AMPUTATION ABOVE KNEE (HCC): Status: ACTIVE | Noted: 2017-08-27

## 2019-11-11 ENCOUNTER — TELEPHONE (OUTPATIENT)
Dept: SURGICAL ONCOLOGY | Facility: CLINIC | Age: 59
End: 2019-11-11

## 2019-11-11 NOTE — TELEPHONE ENCOUNTER
Left message to call our office in regards to patient's New Patient appointment with Dr Zapata on 11/13/19   will be in surgery and we need to move her appointment time up to 9:30am  Requested patient call back to confirm that she received this message

## 2019-11-13 ENCOUNTER — CONSULT (OUTPATIENT)
Dept: SURGICAL ONCOLOGY | Facility: CLINIC | Age: 59
End: 2019-11-13
Payer: COMMERCIAL

## 2019-11-13 VITALS
TEMPERATURE: 97.7 F | RESPIRATION RATE: 18 BRPM | SYSTOLIC BLOOD PRESSURE: 136 MMHG | HEART RATE: 68 BPM | BODY MASS INDEX: 62.76 KG/M2 | DIASTOLIC BLOOD PRESSURE: 70 MMHG | HEIGHT: 69 IN

## 2019-11-13 DIAGNOSIS — R92.8 ABNORMAL MAMMOGRAM OF LEFT BREAST: Primary | ICD-10-CM

## 2019-11-13 PROCEDURE — 99204 OFFICE O/P NEW MOD 45 MIN: CPT | Performed by: SURGERY

## 2019-11-13 RX ORDER — DILTIAZEM HYDROCHLORIDE 120 MG/1
120 CAPSULE, EXTENDED RELEASE ORAL DAILY
Refills: 3 | COMMUNITY
Start: 2019-10-25 | End: 2020-07-24

## 2019-11-13 NOTE — PROGRESS NOTES
Breast Consultation-Surgical Oncology     [address] [facility] SURGICAL ONCOLOGY [facility]    Name:  Radha Guo  YOB: 1960  MRN:  [id number]    Assessment/Plan   Diagnoses and all orders for this visit:    Abnormal mammogram of left breast  -     Mammo diagnostic left w cad; Future            HPI: Radha Guo is a 63y o  year old female who presents with an abnormal left breast mammogram showing microcalcifications and the recommendation for a surgical biopsy  Pt denies any breast lumps, nipple discharge or skin changes  Surgical treatment to date consisted of not applicable  Oncology History:     No history exists         Pertinent reproductive history:  Age at menarche:  [age]  OB History        5    Para   4    Term   4            AB        Living           SAB        TAB        Ectopic        Multiple        Live Births   4           Obstetric Comments   Menarche : [age]  Age at first childbirth : 22             Age at first live birth:  22  Hysterectomy/Oophrectomy:  No  Hormone replacement therapy:  No  Birth control pills:  No    Problem List:   Patient Active Problem List   Diagnosis    Necrotizing fasciitis ([address]    Atrial fibrillation ([address] )    Morbid obesity ([address]    Essential hypertension    Acute blood loss anemia    GERD (gastroesophageal reflux disease)    Insomnia    Loose stools    Pulmonary embolism ([address] )    Hypertension    Morbid obesity with BMI of 50 0-59 9, adult ([address] )    Phantom limb syndrome without pain (HCC)    Amputation above knee ([address] )    Edema     Past Medical History:   Diagnosis Date    Anemia     Anxiety     Atrial fibrillation (HCC)     Difficulty walking     DVT (deep venous thrombosis) (HCC)     Hypercholesterolemia     Hypertension     Incontinence     Morbid obesity ([address] )     Seasonal allergies     Wears glasses     for reading     Past Surgical History: Procedure Laterality Date     SECTION      INCISION AND DRAINAGE OF WOUND Left 2016    Procedure: INCISION AND DRAINAGE (I&D) EXTREMITY;  Surgeon: Florian Jimenez DPM;  Location: [facility];  Service:     LEG AMPUTATION THROUGH LOWER TIBIA AND FIBULA Left 2016    Procedure: COMPLETION AMPUTATION BELOW KNEE (BKA);   Surgeon: Joyce Shelton MD;  Location[facility];  Service:     LEG AMPUTATION THROUGH LOWER TIBIA AND FIBULA Left 2016    Procedure: GUILLOTINE AMPUTATION BELOW KNEE (BKA) I&D LEFT THIGH;  Surgeon: Zabrina Guzmán MD;  [facility];  Service:     TONSILLECTOMY      WOUND DEBRIDEMENT Left 2016    Procedure: EXCISIONAL DEBRIDEMENT AND WASHOUT LEFT LOWER EXTREMETIY AND LEFT THIGH ;  Surgeon: Joyce Shelton MD;  Location[facility];  Service:      Family History   Adopted: Yes   Family history unknown: Yes     Social History     Socioeconomic History    Marital status: Single     Spouse name: Not on file    Number of children: Not on file    Years of education: Not on file    Highest education level: Not on file   Occupational History    Not on file   Social Needs    Financial resource strain: Not on file    Food insecurity:     Worry: Not on file     Inability: Not on file    Transportation needs:     Medical: Not on file     Non-medical: Not on file   Tobacco Use    Smoking status: Former Smoker    Smokeless tobacco: Former User    Tobacco comment: quit in high school   Substance and Sexual Activity    Alcohol use: Yes     Frequency: Monthly or less     Comment: socially    Drug use: No    Sexual activity: Not on file   Lifestyle    Physical activity:     Days per week: Not on file     Minutes per session: Not on file    Stress: Not on file   Relationships    Social connections:     Talks on phone: Not on file     Gets together: Not on file     Attends Yarsanism service: Not on file     Active member of club or organization: Not on file Attends meetings of clubs or organizations: Not on file     Relationship status: Not on file    Intimate partner violence:     Fear of current or ex partner: Not on file     Emotionally abused: Not on file     Physically abused: Not on file     Forced sexual activity: Not on file   Other Topics Concern    Not on file   Social History Narrative    Not on file     Current Outpatient Medications   Medication Sig Dispense Refill    acetaminophen (TYLENOL) 325 mg tablet Take 2 tablets (650 mg total) by mouth every 6 (six) hours as needed for mild pain  30 tablet 0    apixaban (ELIQUIS) 5 mg Take 1 tablet (5 mg total) by mouth 2 (two) times a day Stop coumadin 180 tablet 3    CARTIA  MG 24 hr capsule Take 120 mg by mouth daily  3    Dextromethorphan-guaiFENesin (ROBITUSSIN COUGH+CHEST MOIZ DM) 5-100 MG/5ML LIQD Take 15 mL by mouth 3 (three) times a day as needed (cough) 1 Bottle 3    lisinopril (ZESTRIL) 20 mg tablet Take 20 mg by mouth daily   loratadine (CLARITIN) 10 mg tablet Take 10 mg by mouth daily  3    Multiple Vitamin (TAB-A-KHURRAM) TABS TAKE 1 TABLET BY MOUTH ONCE DAILY 30 tablet 3    rosuvastatin (CRESTOR) 5 mg tablet Take 1 tablet (5 mg total) by mouth daily 90 tablet 3    sertraline (ZOLOFT) 25 mg tablet Take 1 tablet (25 mg total) by mouth daily 90 tablet 3    traMADol (ULTRAM) 50 mg tablet Take 1 tablet (50 mg total) by mouth every 6 (six) hours as needed for moderate pain 60 tablet 0    furosemide (LASIX) 40 mg tablet Take 40 mg by mouth daily   spironolactone (ALDACTONE) 25 mg tablet Take 25 mg by mouth daily  No current facility-administered medications for this visit        Allergies   Allergen Reactions    Bee Venom Swelling         The following portions of the patient's history were reviewed and updated as appropriate: allergies, current medications, past family history, past medical history, past social history, past surgical history and problem list     Review of Systems:  Review of Systems   Constitutional: Negative  Negative for appetite change and fever  Eyes: Negative  Respiratory: Negative for shortness of breath  Cardiovascular: Negative  Gastrointestinal: Negative  Endocrine: Negative  Genitourinary:        Incontinent bladder   Musculoskeletal: Negative  Negative for arthralgias and myalgias  Difficulty with ambulation, status post amputation, morbidly obese   Skin: Negative  Allergic/Immunologic: Negative  Neurological: Negative  Hematological: Negative  Negative for adenopathy  Does not bruise/bleed easily  Psychiatric/Behavioral: Negative  Physical Exam:  Physical Exam   Constitutional: She is oriented to person, place, and time  Morbidly obese, in wheelchair   HENT:   Head: Normocephalic and atraumatic  Cardiovascular: Normal heart sounds  Pulmonary/Chest: Breath sounds normal  Right breast exhibits no inverted nipple, no mass, no nipple discharge, no skin change and no tenderness  Left breast exhibits no inverted nipple, no mass, no nipple discharge, no skin change and no tenderness  Prominent vessels upper left chest wall   Lymphadenopathy:        Right axillary: No pectoral and no lateral adenopathy present  Left axillary: No pectoral and no lateral adenopathy present  Right: No supraclavicular adenopathy present  Left: No supraclavicular adenopathy present  Neurological: She is alert and oriented to person, place, and time  Psychiatric: She has a normal mood and affect  [id number]  Bilateral screening mammogram B0 (2) secondary to calcifications in the left breast   10/15/19  Left dx mammogram/US  B4 fine pleomorphic calcifications in a linear distribution in the outer central left breast, there are no sonographic correlates          Discussion/Summary:  52-year-old female here today secondary to an abnormal mammogram of the left breast   A biopsy was recommended  She was not able to have a needle biopsy secondary to her exceeding the weight limit of the stereotactic table  She therefore was referred to me to consider surgical excision  Her examination is benign  I did review her imaging  I appreciate the concern with the calcifications  However she has never had a mammogram prior to this and there are no comparison studies  She is also a high risk patient for surgery  She also discusses issues with postop concerns with needing to put a lot of weight on her left upper body secondary to her ability issues  She would therefore be high risk for postoperative complications  I therefore recommended a short-term follow-up diagnostic mammogram of the breast   This would be due in April  I will make these arrangements for her  I will see her again following this to review the images and for another exam   She is agreeable with this plan

## 2020-04-13 ENCOUNTER — TELEPHONE (OUTPATIENT)
Dept: SURGICAL ONCOLOGY | Facility: CLINIC | Age: 60
End: 2020-04-13

## 2022-05-11 PROBLEM — Z44.109 ENCOUNTER FOR FITTING OR ADJUSTMENT OF ARTIFICIAL LOWER EXTREMITY (HCC): Status: ACTIVE | Noted: 2017-08-27

## 2022-05-11 PROBLEM — S28.1XXA: Status: ACTIVE | Noted: 2017-02-23
